# Patient Record
Sex: MALE | Race: WHITE | URBAN - NONMETROPOLITAN AREA
[De-identification: names, ages, dates, MRNs, and addresses within clinical notes are randomized per-mention and may not be internally consistent; named-entity substitution may affect disease eponyms.]

---

## 2022-12-16 ENCOUNTER — TELEPHONE (OUTPATIENT)
Dept: FAMILY MEDICINE CLINIC | Age: 72
End: 2022-12-16

## 2022-12-16 NOTE — TELEPHONE ENCOUNTER
Wife called on patient, he is not a current patient of our practice. Patient is covid positive. Went over the vitamin C, zinc D to help treat symptoms. Patient was taking Mucinex but wanted to be seen by one of our physicians for treatment. After discussing this with co workers. We had no new patient available appointments. Discussed with patient's wife that patient could be seen at urgent care or walk in clinics to be seen. Wife stated that he won't go. I did tell wife that if he starts running a high fever or becomes SOB that it would be best to go to ER. I did offer to sent [facility] a message to become a new patient but wife stated he would refuse.

## 2025-04-11 ENCOUNTER — HOSPITAL ENCOUNTER (EMERGENCY)
Age: 75
Discharge: HOME | End: 2025-04-11
Attending: EMERGENCY MEDICINE
Payer: MEDICARE

## 2025-04-11 ENCOUNTER — APPOINTMENT (OUTPATIENT)
Dept: CT IMAGING | Age: 75
End: 2025-04-11
Payer: MEDICARE

## 2025-04-11 VITALS
OXYGEN SATURATION: 99 % | HEART RATE: 74 BPM | DIASTOLIC BLOOD PRESSURE: 84 MMHG | BODY MASS INDEX: 27.4 KG/M2 | TEMPERATURE: 97.5 F | WEIGHT: 185 LBS | RESPIRATION RATE: 16 BRPM | HEIGHT: 69 IN | SYSTOLIC BLOOD PRESSURE: 155 MMHG
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
## 2025-04-11 DIAGNOSIS — S01.411A: Primary | ICD-10-CM

## 2025-04-11 DIAGNOSIS — S60.222A CONTUSION OF LEFT HAND, INITIAL ENCOUNTER: ICD-10-CM

## 2025-04-11 LAB
ALBUMIN SERPL BCG-MCNC: 4.7 G/DL (ref 3.4–4.9)
ALP SERPL-CCNC: 155 U/L (ref 40–129)
ALT SERPL W/O P-5'-P-CCNC: 12 U/L (ref 10–50)
ANION GAP SERPL CALC-SCNC: 13 MEQ/L (ref 8–16)
AST SERPL-CCNC: 25 U/L (ref 10–50)
BASOPHILS ABSOLUTE: 0.1 THOU/MM3 (ref 0–0.1)
BASOPHILS NFR BLD AUTO: 0.9 %
BILIRUB SERPL-MCNC: 0.7 MG/DL (ref 0.3–1.2)
BUN SERPL-MCNC: 10 MG/DL (ref 8–23)
CALCIUM SERPL-MCNC: 10.5 MG/DL (ref 8.8–10.2)
CHLORIDE SERPL-SCNC: 94 MEQ/L (ref 98–111)
CO2 SERPL-SCNC: 24 MEQ/L (ref 22–29)
CREAT SERPL-MCNC: 1 MG/DL (ref 0.7–1.2)
DEPRECATED RDW RBC AUTO: 46 FL (ref 35–45)
EOSINOPHIL NFR BLD AUTO: 1.4 %
EOSINOPHILS ABSOLUTE: 0.1 THOU/MM3 (ref 0–0.4)
ERYTHROCYTE [DISTWIDTH] IN BLOOD BY AUTOMATED COUNT: 13.4 % (ref 11.5–14.5)
GFR SERPL CREATININE-BSD FRML MDRD: 80 ML/MIN/1.73M2
GLUCOSE SERPL-MCNC: 78 MG/DL (ref 74–109)
HCT VFR BLD AUTO: 49.3 % (ref 42–52)
HGB BLD-MCNC: 16.8 GM/DL (ref 14–18)
IMM GRANULOCYTES # BLD AUTO: 0.02 THOU/MM3 (ref 0–0.07)
IMM GRANULOCYTES NFR BLD AUTO: 0.2 %
LYMPHOCYTES ABSOLUTE: 1.4 THOU/MM3 (ref 1–4.8)
LYMPHOCYTES NFR BLD AUTO: 16 %
MCH RBC QN AUTO: 31.8 PG (ref 26–33)
MCHC RBC AUTO-ENTMCNC: 34.1 GM/DL (ref 32.2–35.5)
MCV RBC AUTO: 93.4 FL (ref 80–94)
MONOCYTES ABSOLUTE: 0.7 THOU/MM3 (ref 0.4–1.3)
MONOCYTES NFR BLD AUTO: 8.1 %
NEUTROPHILS ABSOLUTE: 6.3 THOU/MM3 (ref 1.8–7.7)
NEUTROPHILS NFR BLD AUTO: 73.4 %
NRBC BLD AUTO-RTO: 0 /100 WBC
OSMOLALITY SERPL CALC.SUM OF ELEC: 260.6 MOSMOL/KG (ref 275–300)
PLATELET # BLD AUTO: 337 THOU/MM3 (ref 130–400)
PMV BLD AUTO: 9.5 FL (ref 9.4–12.4)
POTASSIUM SERPL-SCNC: 4.4 MEQ/L (ref 3.5–5.2)
PROT SERPL-MCNC: 8.3 G/DL (ref 6.4–8.3)
RBC # BLD AUTO: 5.28 MILL/MM3 (ref 4.7–6.1)
SODIUM SERPL-SCNC: 131 MEQ/L (ref 135–145)
WBC # BLD AUTO: 8.6 THOU/MM3 (ref 4.8–10.8)

## 2025-04-11 PROCEDURE — 6360000002 HC RX W HCPCS: Performed by: EMERGENCY MEDICINE

## 2025-04-11 PROCEDURE — 2580000003 HC RX 258: Performed by: EMERGENCY MEDICINE

## 2025-04-11 PROCEDURE — 90471 IMMUNIZATION ADMIN: CPT | Performed by: EMERGENCY MEDICINE

## 2025-04-11 PROCEDURE — 70486 CT MAXILLOFACIAL W/O DYE: CPT

## 2025-04-11 PROCEDURE — 99203 OFFICE O/P NEW LOW 30 MIN: CPT | Performed by: NURSE PRACTITIONER

## 2025-04-11 PROCEDURE — 80053 COMPREHEN METABOLIC PANEL: CPT

## 2025-04-11 PROCEDURE — 99215 OFFICE O/P EST HI 40 MIN: CPT

## 2025-04-11 PROCEDURE — 90715 TDAP VACCINE 7 YRS/> IM: CPT | Performed by: EMERGENCY MEDICINE

## 2025-04-11 PROCEDURE — 6370000000 HC RX 637 (ALT 250 FOR IP): Performed by: NURSE PRACTITIONER

## 2025-04-11 PROCEDURE — 85025 COMPLETE CBC W/AUTO DIFF WBC: CPT

## 2025-04-11 PROCEDURE — 36415 COLL VENOUS BLD VENIPUNCTURE: CPT

## 2025-04-11 PROCEDURE — 99205 OFFICE O/P NEW HI 60 MIN: CPT

## 2025-04-11 PROCEDURE — 12015 RPR F/E/E/N/L/M 7.6-12.5 CM: CPT

## 2025-04-11 PROCEDURE — 99284 EMERGENCY DEPT VISIT MOD MDM: CPT

## 2025-04-11 PROCEDURE — 72125 CT NECK SPINE W/O DYE: CPT

## 2025-04-11 PROCEDURE — 70450 CT HEAD/BRAIN W/O DYE: CPT

## 2025-04-11 PROCEDURE — 96365 THER/PROPH/DIAG IV INF INIT: CPT

## 2025-04-11 RX ORDER — CALCIUM CARBONATE 500 MG/1
1 TABLET, CHEWABLE ORAL DAILY
COMMUNITY

## 2025-04-11 RX ORDER — FERROUS SULFATE 325(65) MG
325 TABLET ORAL
COMMUNITY

## 2025-04-11 RX ORDER — ACETAMINOPHEN 325 MG/1
650 TABLET ORAL ONCE
Status: COMPLETED | OUTPATIENT
Start: 2025-04-11 | End: 2025-04-11

## 2025-04-11 RX ORDER — LIDOCAINE HYDROCHLORIDE AND EPINEPHRINE 10; 10 MG/ML; UG/ML
20 INJECTION, SOLUTION INFILTRATION; PERINEURAL ONCE
Status: COMPLETED | OUTPATIENT
Start: 2025-04-11 | End: 2025-04-11

## 2025-04-11 RX ORDER — MULTIVIT WITH MINERALS/LUTEIN
500 TABLET ORAL DAILY
COMMUNITY

## 2025-04-11 RX ORDER — LIDOCAINE HYDROCHLORIDE 20 MG/ML
20 INJECTION, SOLUTION INFILTRATION; PERINEURAL ONCE
Status: COMPLETED | OUTPATIENT
Start: 2025-04-11 | End: 2025-04-11

## 2025-04-11 RX ORDER — MULTIVITAMIN WITH IRON
1 TABLET ORAL DAILY
COMMUNITY

## 2025-04-11 RX ADMIN — SODIUM CHLORIDE 3000 MG: 9 INJECTION, SOLUTION INTRAVENOUS at 20:20

## 2025-04-11 RX ADMIN — TETANUS TOXOID, REDUCED DIPHTHERIA TOXOID AND ACELLULAR PERTUSSIS VACCINE, ADSORBED 0.5 ML: 5; 2.5; 8; 8; 2.5 SUSPENSION INTRAMUSCULAR at 19:27

## 2025-04-11 RX ADMIN — ACETAMINOPHEN 650 MG: 325 TABLET ORAL at 17:45

## 2025-04-11 RX ADMIN — LIDOCAINE HYDROCHLORIDE 20 ML: 20 INJECTION, SOLUTION INFILTRATION; PERINEURAL at 19:48

## 2025-04-11 RX ADMIN — LIDOCAINE HYDROCHLORIDE AND EPINEPHRINE 20 ML: 10; 10 INJECTION, SOLUTION INFILTRATION; PERINEURAL at 19:50

## 2025-04-11 ASSESSMENT — PAIN SCALES - GENERAL
PAINLEVEL_OUTOF10: 6
PAINLEVEL_OUTOF10: 5
PAINLEVEL_OUTOF10: 7

## 2025-04-11 ASSESSMENT — PAIN DESCRIPTION - PAIN TYPE: TYPE: ACUTE PAIN

## 2025-04-11 ASSESSMENT — PAIN DESCRIPTION - DESCRIPTORS
DESCRIPTORS_2: ACHING
DESCRIPTORS: ACHING
DESCRIPTORS: ACHING

## 2025-04-11 ASSESSMENT — ENCOUNTER SYMPTOMS
CHOKING: 0
ABDOMINAL PAIN: 0
COUGH: 0
BOWEL INCONTINENCE: 0
VISUAL CHANGE: 0
STRIDOR: 0
CHEST TIGHTNESS: 0
SHORTNESS OF BREATH: 0
NAUSEA: 0
COLOR CHANGE: 0
WHEEZING: 0
VOMITING: 0
APNEA: 0

## 2025-04-11 ASSESSMENT — PAIN DESCRIPTION - LOCATION
LOCATION: FACE
LOCATION: FACE

## 2025-04-11 ASSESSMENT — PAIN DESCRIPTION - ORIENTATION
ORIENTATION: RIGHT
ORIENTATION: RIGHT

## 2025-04-11 ASSESSMENT — PAIN - FUNCTIONAL ASSESSMENT
PAIN_FUNCTIONAL_ASSESSMENT: 0-10
PAIN_FUNCTIONAL_ASSESSMENT: 0-10

## 2025-04-11 NOTE — ED PROVIDER NOTES
[facility] EMERGENCY DEPARTMENT  Urgent Care Encounter      CHIEF COMPLAINT       Chief Complaint   Patient presents with    Fall     Pt fell in driveway outside and hit asphalt occurred around 1630    Laceration       Nurses Notes reviewed and I agree except as noted in the HPI.  HISTORY OFPRESENT ILLNESS   Alex A Temme is a 74 y.o.  The history is provided by the patient and a relative. No  was used.   Fall  The accident occurred 1 to 2 hours ago. The fall occurred while walking. He fell from a height of 3 to 5 ft. He landed on [address]. The point of impact was the head. The pain is present in the head (left hand). The pain is at a severity of 5/10. The pain is moderate. He was Ambulatory at the scene. There was No entrapment after the fall. There was No drug use involved in the accident. There was No alcohol use involved in the accident. Pertinent negatives include no visual change, no fever, no numbness, no abdominal pain, no bowel incontinence, no nausea, no vomiting, no hematuria, no headaches, no hearing loss, no loss of consciousness and no tingling. The symptoms are aggravated by pressure on the injury. He has tried ice and acetaminophen for the symptoms. Improvement on treatment: given at urgent care.       REVIEW OF SYSTEMS     Review of Systems   Constitutional:  Positive for activity change. Negative for appetite change, chills, diaphoresis, fatigue and fever.   Respiratory:  Negative for apnea, cough, choking, chest tightness, shortness of breath, wheezing and stridor.    Cardiovascular:  Negative for chest pain, palpitations and leg swelling.   Gastrointestinal:  Negative for abdominal pain, bowel incontinence, nausea and vomiting.   Genitourinary:  Negative for hematuria.   Skin:  Positive for wound. Negative for color change, pallor and rash.   Neurological:  Negative for tingling, loss of consciousness, numbness and headaches.       PAST MEDICAL  to person, place, and time.   Psychiatric:         Mood and Affect: Mood normal.         Behavior: Behavior normal.         Thought Content: Thought content normal.         Judgment: Judgment normal.         DIAGNOSTIC RESULTS   Labs:No results found for this visit on 04/11/25.    IMAGING:  No orders to display     URGENT CARE COURSE:     Vitals:    04/11/25 1725   BP: (!) 172/94   Pulse: 76   Resp: 16   Temp: 97.5 °F (36.4 °C)   SpO2: 100%   Weight: 83.9 kg (185 lb)   Height: 1.753 m (5' 9\")       Medications   acetaminophen (TYLENOL) tablet 650 mg (650 mg Oral Given 4/11/25 1745)     PROCEDURES:  None  FINAL IMPRESSION      1. Complex laceration of cheek, right, initial encounter        DISPOSITION/PLAN   Decision To Transfer     After reviewing the patients History of Present illness, Vital Signs,Clinical Findings,Comorbities, and Clinical Data obtained I discussed with the patient or patient representative that there is a very real potential for serious injury / illness and the patient will need to be transfer to a level of higher care for further evaluation and continued care. It was explained that this would provide the best care for the patient.   The patient/patient representative are agreeable to the treatment plan and are agreeable to be transferred therefore, the patient will be transferred to [facility] ED for reevaluation and further management .    Report was called to the accepting facility and given to Luke WHITT, who accepted the patients transfer. Patient was transferred from the Urgent Care in stable condition by private car, daughter driving.      REFERRED TO:  [facility]  [address] [facility] [place] [address]  Go today      DISCHARGE MEDICATIONS:  New Prescriptions    No medications on file     Current Discharge Medication List          AMY Joyner CNP, Tawnya Rae, APRN - CNP  04/11/25 5952

## 2025-04-11 NOTE — ED PROVIDER NOTES
[facility] EMERGENCY DEPARTMENT      EMERGENCY MEDICINE     Pt Name: Alex Garcia  MRN: [id number]  Birthdate 1950  Date of evaluation: 4/11/2025  Provider: Suzy Chris PA-C    CHIEF COMPLAINT       Chief Complaint   Patient presents with    Fall     Pt fell in driveway outside and hit asphalt occurred around 1630    Laceration     HISTORY OF PRESENT ILLNESS   Alex Garcia is a pleasant 74 y.o. male who presents to the emergency department from as a transfer from, urgent care for evaluation of fall, laceration.    Patient reported that around 1630 today he was outside when he fell on the sidewalk face first.  Patient denies dizziness/lightheadedness/vision changes prior and after to the fall.  He states due to pain she shuffles around when ambulating and tripped on something that caused the fall.  Patient landed on the right side of his face.  Patient denies loss of consciousness, changes in vision, nausea vomiting, chest pain, difficulty breathing, being on anticoagulants/antiplatelets.  Patient states his current pain level is 5/10 with a burning sensation.  Patient's left posterior hand also suffered a laceration/contusion.  Otherwise patient has no further complaints.    PASTMEDICAL HISTORY   History reviewed. No pertinent past medical history.    There is no problem list on file for this patient.    SURGICAL HISTORY       Past Surgical History:   Procedure Laterality Date    STOMACH FOREIGN BODY REMOVAL  1985    partial removal due ulcers       CURRENT MEDICATIONS       Previous Medications    ASCORBIC ACID (VITAMIN C) 250 MG TABLET    Take 2 tablets by mouth daily    CALCIUM CARBONATE (TUMS) 500 MG CHEWABLE TABLET    Take 1 tablet by mouth daily    ELDERBERRY PO    Take 1 tablet by mouth    FERROUS SULFATE (IRON 325) 325 (65 FE) MG TABLET    Take 1 tablet by mouth daily (with breakfast)    MULTIPLE VITAMIN (MULTIVITAMIN) TABS TABLET    Take 1 tablet by mouth daily    VITAMIN D (CHOLECALCIFEROL) 25  CBC, CMP - unremarkable               External Documentation Reviewed:         Previous patient encounter documents & history available on EMR was reviewed              See Formal Diagnostic Results above for the lab and radiology tests and orders.    3)  Treatment and Disposition         ED Reassessment: See ED course          Case discussed with consulting clinician:  Dr. Blackwell          Shared Decision-Making was performed and disposition discussed with the        Patient/Family and questions answered          Code Status:  Full       Summary of Patient Presentation:      MDM  /  ED Course as of 04/11/25 2122 Fri Apr 11, 2025 1847 Case presented to Dr. Blackwell. Assessed pt's wound. Through shared decision making, pt wanted the laceration repair done in the ED. Plastic surgery option was given but the pt declined at this time.  [LK]   1857 CT facial bones, cervical spine, head ordered  [LK]   2046 Sutures completed. 5 internal sutures and 11 external sutures placed.  [LK]   2114 Tetanus given. Unasyn administered.  [LK]   2115 Augmentin prescribed for abx prophylaxis  [LK]   2121 Reassessed the patient.  Patient sitting down comfortably in the ED bed.  Sutures in place.  No immediate complications visually noted.  I gave the patient strict return precautions.  Patient is a follow-up with either his family doctor or come back to the ED in 5 to 7 days to have the external sutures removed.  Patient verbalized understanding. [LK]      ED Course User Index  [LK] Suzy Chris PA-C     Vitals Reviewed:    Vitals:    04/11/25 1725 04/11/25 1838 04/11/25 1926   BP: (!) 172/94 (!) 176/85 (!) 155/84   Pulse: 76 74 74   Resp: 16 18 16   Temp: 97.5 °F (36.4 °C)     SpO2: 100% 99% 99%   Weight: 83.9 kg (185 lb)     Height: 1.753 m (5' 9\")     Patient reported that around 1630 today he was outside when he fell on the sidewalk face first.  Patient denies dizziness/lightheadedness/vision changes prior and after to the fall.  He states

## 2025-04-11 NOTE — DISCHARGE INSTRUCTIONS
Go to your primary care physician or return to the emergency department in 5-7 days to have your sutures removed.    Take antibiotics as prescribed.    For pain use acetaminophen (Tylenol) or ibuprofen (Motrin / Advil), unless prescribed medications that have acetaminophen or ibuprofen (or similar medications) in it.  You can take over the counter acetaminophen tablets (1 - 2 tablets of the 500-mg strength every 6 hours) or ibuprofen tablets (2 tablets every 4 hours).    You can shower with the laceration, would avoid baths or swimming in lakes / rivers.  Apply bacitracin / triple antibiotic ointment / Neosporin / Vaseline to the wound twice a day.    When you go outside, place sunscreen on the healing wound after the sutures have been removed for the next year to help with scarring.    PLEASE RETURN TO THE EMERGENCY DEPARTMENT IMMEDIATELY for worsening symptoms, redness around the wound or redness streaking up the body part, white drainage from the wound, or if you develop any concerning symptoms such as: high fever not relieved by acetaminophen (Tylenol) and/or ibuprofen (Motrin / Advil), chills, shortness of breath, chest pain, feeling of your heart fluttering or racing, persistent nausea and/or vomiting, vomiting up blood, blood in your stool, numbness, loss of consciousness, weakness or tingling in the arms or legs or change in color of the extremities, changes in mental status, persistent headache, blurry vision, loss of bladder / bowel control, unable to follow up with your physician, or other any other care or concern.

## 2025-04-11 NOTE — ED NOTES
Patient taken to vehicle by wheelchair, patient did state on the way out that he felt nauseated after his fall and had taken some Tums. Patient denies any nausea now.      Niru Ybarra, RN  04/11/25 9111

## 2025-04-11 NOTE — ED NOTES
Moist 4 x 4 gauze pack applied to right facial laceration and covered with ABD pad and secured with ace wrap and tape. Wound wash applied to left hand skin tear and right forearm abrasion and patted dry. Left hand skin tear has dressing intact with coban.      Aruna Lewis, RN  04/11/25 7931

## 2025-04-11 NOTE — ED PROVIDER NOTES
PATIENT NAME: Alex Garcia  MRN: [id number]  : 1950  GONZALEZ: 2025    I have seen and examined the patient myself and I have reviewed the advanced practice clinician's chart. Please refer to advanced practice clinician's chart for detailed history of present illness, physical exam and medical decision making. I agree with [facility]'s assessment and plan.     MEDICAL DEDISION MAKING (MDM)     Alex Garcia is a 74 y.o. male who presents with fall and right side facial complex lacerations. Seen below pictures.     Mechanical fall.  No LOC.  Patient fell on the asphalt ground.  He is not taking OAC.     CTs head, C-spine, and facial bone do not reveal internal injuries, no facial fractures.    Lacerations are repaired in the ED with my assistance, please refer to [facility]'s procedure notes.     Patient receives tetanus boost and Unasyn in ED.    Discharged with Augmentin prescribed.    PCP follow-up in 5-7 days for wound check and suture removal.        Vitals:    25 1725 25 1838 25   BP: (!) 172/94 (!) 176/85 (!) 155/84   Pulse: 76 74 74   Resp: 16 18 16   Temp: 97.5 °F (36.4 °C)     SpO2: 100% 99% 99%   Weight: 83.9 kg (185 lb)     Height: 1.753 m (5' 9\")       Medications   acetaminophen (TYLENOL) tablet 650 mg (650 mg Oral Given 25 174)   tetanus-diphth-acell pertussis (BOOSTRIX) injection 0.5 mL (0.5 mLs IntraMUSCular Given 25)   lidocaine-EPINEPHrine 1 %-1:027042 injection 20 mL (20 mLs IntraDERmal Given 25)   lidocaine 2 % injection 20 mL (20 mLs IntraDERmal Given 25)   ampicillin-sulbactam (UNASYN) 3,000 mg in sodium chloride 0.9 % 100 mL IVPB (addEASE) (0 mg IntraVENous Stopped 25)     Labs Reviewed   CBC WITH AUTO DIFFERENTIAL - Abnormal; Notable for the following components:       Result Value    RDW-SD 46.0 (*)     All other components within normal limits   COMPREHENSIVE METABOLIC PANEL - Abnormal; Notable for the following components:

## 2025-04-11 NOTE — ED TRIAGE NOTES
Pt fell in driveway outside and hit asphalt occurred around 1630 4/11/25. Pt has large laceration noted near right eye and fascia noted at laceration site, large skin tear shape of V posterior left hand. Pt denies LOC. Pt states it \"took him a little bit to stand up and get back in the house \"and then patient called daughter who brought patient to [facility]. Pt states he has a hx of falling because he \"shuffles\" his feet.  Pt is A & O x 3. Respirations are non-labored.

## 2025-04-11 NOTE — ED NOTES
Pt to go to [facility] ER via private vehicle daughter driving. Assisted to private vehicle by nurse and wheelchair. Pt is A & O x 3. Respirations are non-labored.      Aruna Lewis RN  04/11/25 0185

## 2025-04-11 NOTE — ED NOTES
Pt informed nurse he has not been to the \"doctor's since the 1980's\".  Pt agreeable to go to [facility] ER.     Aruna Lewis, RN  04/11/25 1807

## 2025-04-17 ENCOUNTER — OFFICE VISIT (OUTPATIENT)
Dept: FAMILY MEDICINE CLINIC | Age: 75
End: 2025-04-17

## 2025-04-17 VITALS
TEMPERATURE: 97.7 F | HEIGHT: 69 IN | BODY MASS INDEX: 27.88 KG/M2 | DIASTOLIC BLOOD PRESSURE: 84 MMHG | HEART RATE: 83 BPM | RESPIRATION RATE: 19 BRPM | WEIGHT: 188.2 LBS | SYSTOLIC BLOOD PRESSURE: 132 MMHG | OXYGEN SATURATION: 96 %

## 2025-04-17 DIAGNOSIS — Z91.81 AT HIGH RISK FOR FALLS: ICD-10-CM

## 2025-04-17 DIAGNOSIS — Z13.29 SCREENING FOR THYROID DISORDER: ICD-10-CM

## 2025-04-17 DIAGNOSIS — Z13.1 SCREENING FOR DIABETES MELLITUS: ICD-10-CM

## 2025-04-17 DIAGNOSIS — Z13.220 SCREENING FOR LIPID DISORDERS: ICD-10-CM

## 2025-04-17 DIAGNOSIS — Z76.89 ENCOUNTER TO ESTABLISH CARE: Primary | ICD-10-CM

## 2025-04-17 DIAGNOSIS — S01.81XD FACIAL LACERATION, SUBSEQUENT ENCOUNTER: ICD-10-CM

## 2025-04-17 DIAGNOSIS — Z48.02 ENCOUNTER FOR REMOVAL OF SUTURES: ICD-10-CM

## 2025-04-17 RX ORDER — MUPIROCIN 20 MG/G
OINTMENT TOPICAL
Qty: 15 G | Refills: 0 | Status: SHIPPED | OUTPATIENT
Start: 2025-04-17 | End: 2025-04-24

## 2025-04-17 SDOH — ECONOMIC STABILITY: FOOD INSECURITY: WITHIN THE PAST 12 MONTHS, THE FOOD YOU BOUGHT JUST DIDN'T LAST AND YOU DIDN'T HAVE MONEY TO GET MORE.: NEVER TRUE

## 2025-04-17 SDOH — ECONOMIC STABILITY: FOOD INSECURITY: WITHIN THE PAST 12 MONTHS, YOU WORRIED THAT YOUR FOOD WOULD RUN OUT BEFORE YOU GOT MONEY TO BUY MORE.: NEVER TRUE

## 2025-04-17 ASSESSMENT — PATIENT HEALTH QUESTIONNAIRE - PHQ9
2. FEELING DOWN, DEPRESSED OR HOPELESS: NOT AT ALL
1. LITTLE INTEREST OR PLEASURE IN DOING THINGS: NOT AT ALL
SUM OF ALL RESPONSES TO PHQ QUESTIONS 1-9: 0

## 2025-04-17 NOTE — PROGRESS NOTES
Alex Garcia is a 74 y.o. male who presents today for:  Chief Complaint   Patient presents with    New Patient     In office as a new patient establishing care. States he's here to have stitches removed. Feels very unsteady on feet worried about falling.      HPI:   Alex Garcia is 74 y.o. who presents today for ED follow-up visit/establish care.  Has not seen a doctor in 35+ years.    Patient seen in ED on 4/11 for facial contusion/laceration after mechanical fall sustained while walking dogs.  Patient notes fourth fall sustained in the last 7 months.  No LOC.  Not on OAC.   Imaging negative for acute pathology including fractures, internal injuries.  Noted right-sided facial lac.  Treated with laceration repair and Unasyn x 1 in ED, discharged on Augmentin.  Today, patient presents for follow-up, suture removal.  Reports that he took augmentin for 2 days, however stopped taking due to symptoms of diarrhea with medication use.  Denies any HA, numbness, tingling, lightheadedness, drowsiness, LOC, presyncope/syncopal episode, CP, SOB, or any other associated symptoms from recent fall.  Uses cane currently for support to walk.      Labs done in ED on 4/11 reviewed in office with patient.  CMP showed mild hyponatremia, hypercalcemia, elevated ALP.  CBC WNL.    Past medical history/chronic conditions - nil    Surgical history -- sx for stomach ulcer several years ago    Meds -- multivitamins    Family history -- nil    Social history -- lives with wife at home.  No environmental safety concerns noted.  Has 2 daughters who live in [place].    Denies any chest pain, shortness of breath, headache, dizziness, abdominal pain, nausea, vomiting, diarrhea, leg swelling, numbness, tingling or any other complaints at this time.    Objective:     Vitals:    04/17/25 0807   BP: 132/84   BP Site: Left Upper Arm   Patient Position: Sitting   BP Cuff Size: Medium Adult   Pulse: 83   Resp: 19   Temp: 97.7 °F (36.5 °C)

## 2025-06-17 ENCOUNTER — HOSPITAL ENCOUNTER (OUTPATIENT)
Dept: PHYSICAL THERAPY | Age: 75
Setting detail: THERAPIES SERIES
Discharge: HOME | End: 2025-06-17
Payer: MEDICARE

## 2025-06-17 PROCEDURE — 97110 THERAPEUTIC EXERCISES: CPT

## 2025-06-17 PROCEDURE — 97162 PT EVAL MOD COMPLEX 30 MIN: CPT

## 2025-06-17 NOTE — PROGRESS NOTES
[facility]  PHYSICAL THERAPY  [x] EVALUATION  [] DAILY NOTE (LAND) [] DAILY NOTE (AQUATIC ) [] PROGRESS NOTE [] DISCHARGE NOTE    [x] OUTPATIENT REHABILITATION CENTER [facility]   [] [facility] CARE [place]    [] [facility]   [] [facility]    Date: 2025  Patient Name:  Alex Garcia  : 1950  MRN: [id number]  CSN: [id number]    Referring Practitioner Nina Morrison MD [id number]      Diagnosis  Diagnoses       Z91.81 (ICD-10-CM) - At high risk for falls           Treatment Diagnosis R26.81  Unsteadiness on feet  M62.81 Generalized Muscle Weakness  R26.89  Abnormalities of gait and mobility   Date of Evaluation 25   Additional Pertinent History Alex Garcia has no past medical history on file.  he has a past surgical history that includes Stomach foreign body removal ().     Allergies No Known Allergies   Medications   Current Outpatient Medications:     Handicap Placard MISC, by Does not apply route, Disp: 1 each, Rfl: 0    Multiple Vitamin (MULTIVITAMIN) TABS tablet, Take 1 tablet by mouth daily, Disp: , Rfl:     Ascorbic Acid (VITAMIN C) 250 MG tablet, Take 2 tablets by mouth daily, Disp: , Rfl:     ferrous sulfate (IRON 325) 325 (65 Fe) MG tablet, Take 1 tablet by mouth daily (with breakfast), Disp: , Rfl:     ELDERBERRY PO, Take 1 tablet by mouth, Disp: , Rfl:     vitamin D (CHOLECALCIFEROL) 25 MCG (1000 UT) TABS tablet, Take 1 tablet by mouth daily, Disp: , Rfl:     calcium carbonate (TUMS) 500 MG chewable tablet, Take 1 tablet by mouth daily, Disp: , Rfl:       Functional Outcome Measure Used ALTMAN, 5XSTS   Functional Outcome Score 39/56, 19 sec (25)       Insurance: Primary: Payor: MEDICARE /  /  / ,   Secondary: [facility]   Authorization Information INSURANCE THERAPY BENEFIT:  No visit limit. Treatment must be medically necessary and must required the skill of a licensed therapist or therapist assistant.  Benefit will not cover maintenance or

## 2025-07-01 ENCOUNTER — HOSPITAL ENCOUNTER (OUTPATIENT)
Dept: PHYSICAL THERAPY | Age: 75
Setting detail: THERAPIES SERIES
Discharge: HOME | End: 2025-07-01
Payer: MEDICARE

## 2025-07-01 PROCEDURE — 97112 NEUROMUSCULAR REEDUCATION: CPT

## 2025-07-01 NOTE — PROGRESS NOTES
[facility]  PHYSICAL THERAPY  [] EVALUATION  [x] DAILY NOTE (LAND) [] DAILY NOTE (AQUATIC ) [] PROGRESS NOTE [] DISCHARGE NOTE    [x] OUTPATIENT REHABILITATION CENTER [facility]   [] [facility] CARE [place]    [] [facility]   [] [facility]    Date: 2025  Patient Name:  Alex Garcia  : 1950  MRN: [id number]  CSN: [id number]    Referring Practitioner Nina Morrison MD [id number]      Diagnosis  Diagnoses       Z91.81 (ICD-10-CM) - At high risk for falls           Treatment Diagnosis R26.81  Unsteadiness on feet  M62.81 Generalized Muscle Weakness  R26.89  Abnormalities of gait and mobility   Date of Evaluation 25   Additional Pertinent History Alex Garcia has no past medical history on file.  he has a past surgical history that includes Stomach foreign body removal ().     Allergies No Known Allergies   Medications   Current Outpatient Medications:     Handicap Placard MISC, by Does not apply route, Disp: 1 each, Rfl: 0    Multiple Vitamin (MULTIVITAMIN) TABS tablet, Take 1 tablet by mouth daily, Disp: , Rfl:     Ascorbic Acid (VITAMIN C) 250 MG tablet, Take 2 tablets by mouth daily, Disp: , Rfl:     ferrous sulfate (IRON 325) 325 (65 Fe) MG tablet, Take 1 tablet by mouth daily (with breakfast), Disp: , Rfl:     ELDERBERRY PO, Take 1 tablet by mouth, Disp: , Rfl:     vitamin D (CHOLECALCIFEROL) 25 MCG (1000 UT) TABS tablet, Take 1 tablet by mouth daily, Disp: , Rfl:     calcium carbonate (TUMS) 500 MG chewable tablet, Take 1 tablet by mouth daily, Disp: , Rfl:       Functional Outcome Measure Used ALTMAN, 5XSTS   Functional Outcome Score 39/56, 19 sec (25)       Insurance: Primary: Payor: MEDICARE /  /  / ,   Secondary: [facility]   Authorization Information INSURANCE THERAPY BENEFIT:  No visit limit. Treatment must be medically necessary and must required the skill of a licensed therapist or therapist assistant.  Benefit will not cover maintenance or

## 2025-07-15 ENCOUNTER — HOSPITAL ENCOUNTER (OUTPATIENT)
Dept: PHYSICAL THERAPY | Age: 75
Setting detail: THERAPIES SERIES
Discharge: HOME | End: 2025-07-15
Payer: MEDICARE

## 2025-07-15 PROCEDURE — 97110 THERAPEUTIC EXERCISES: CPT

## 2025-07-15 PROCEDURE — 97112 NEUROMUSCULAR REEDUCATION: CPT

## 2025-07-15 NOTE — PROGRESS NOTES
[facility]  PHYSICAL THERAPY  [] EVALUATION  [x] DAILY NOTE (LAND) [] DAILY NOTE (AQUATIC ) [] PROGRESS NOTE [] DISCHARGE NOTE    [x] OUTPATIENT REHABILITATION CENTER [facility]   [] [facility] CARE [place]    [] [facility]   [] [facility]    Date: 7/15/2025  Patient Name:  Alex Garcia  : 1950  MRN: [id number]  CSN: [id number]    Referring Practitioner Nina Morrison MD [id number]      Diagnosis  Diagnoses       Z91.81 (ICD-10-CM) - At high risk for falls           Treatment Diagnosis R26.81  Unsteadiness on feet  M62.81 Generalized Muscle Weakness  R26.89  Abnormalities of gait and mobility   Date of Evaluation 25   Additional Pertinent History Alex Garcia has no past medical history on file.  he has a past surgical history that includes Stomach foreign body removal ().     Allergies No Known Allergies   Medications   Current Outpatient Medications:     Handicap Placard MISC, by Does not apply route, Disp: 1 each, Rfl: 0    Multiple Vitamin (MULTIVITAMIN) TABS tablet, Take 1 tablet by mouth daily, Disp: , Rfl:     Ascorbic Acid (VITAMIN C) 250 MG tablet, Take 2 tablets by mouth daily, Disp: , Rfl:     ferrous sulfate (IRON 325) 325 (65 Fe) MG tablet, Take 1 tablet by mouth daily (with breakfast), Disp: , Rfl:     ELDERBERRY PO, Take 1 tablet by mouth, Disp: , Rfl:     vitamin D (CHOLECALCIFEROL) 25 MCG (1000 UT) TABS tablet, Take 1 tablet by mouth daily, Disp: , Rfl:     calcium carbonate (TUMS) 500 MG chewable tablet, Take 1 tablet by mouth daily, Disp: , Rfl:       Functional Outcome Measure Used ALTMAN, 5XSTS   Functional Outcome Score 39/56, 19 sec (25)       Insurance: Primary: Payor: MEDICARE /  /  / ,   Secondary: [facility]   Authorization Information INSURANCE THERAPY BENEFIT:  No visit limit. Treatment must be medically necessary and must required the skill of a licensed therapist or therapist assistant.  Benefit will not cover maintenance or

## 2025-07-17 ENCOUNTER — HOSPITAL ENCOUNTER (OUTPATIENT)
Dept: PHYSICAL THERAPY | Age: 75
Setting detail: THERAPIES SERIES
Discharge: HOME | End: 2025-07-17
Payer: MEDICARE

## 2025-07-17 PROCEDURE — 97112 NEUROMUSCULAR REEDUCATION: CPT

## 2025-07-17 PROCEDURE — 97110 THERAPEUTIC EXERCISES: CPT

## 2025-07-17 NOTE — PROGRESS NOTES
[facility]  PHYSICAL THERAPY  [] EVALUATION  [x] DAILY NOTE (LAND) [] DAILY NOTE (AQUATIC ) [] PROGRESS NOTE [] DISCHARGE NOTE    [x] OUTPATIENT REHABILITATION CENTER [facility]   [] [facility] CARE [place]    [] [facility]   [] [facility]    Date: 2025  Patient Name:  Alex Garcia  : 1950  MRN: [id number]  CSN: [id number]    Referring Practitioner Nina Morrison MD [id number]      Diagnosis  Diagnoses       Z91.81 (ICD-10-CM) - At high risk for falls           Treatment Diagnosis R26.81  Unsteadiness on feet  M62.81 Generalized Muscle Weakness  R26.89  Abnormalities of gait and mobility   Date of Evaluation 25   Additional Pertinent History Alex Garcia has no past medical history on file.  he has a past surgical history that includes Stomach foreign body removal ().     Allergies No Known Allergies   Medications   Current Outpatient Medications:     Handicap Placard MISC, by Does not apply route, Disp: 1 each, Rfl: 0    Multiple Vitamin (MULTIVITAMIN) TABS tablet, Take 1 tablet by mouth daily, Disp: , Rfl:     Ascorbic Acid (VITAMIN C) 250 MG tablet, Take 2 tablets by mouth daily, Disp: , Rfl:     ferrous sulfate (IRON 325) 325 (65 Fe) MG tablet, Take 1 tablet by mouth daily (with breakfast), Disp: , Rfl:     ELDERBERRY PO, Take 1 tablet by mouth, Disp: , Rfl:     vitamin D (CHOLECALCIFEROL) 25 MCG (1000 UT) TABS tablet, Take 1 tablet by mouth daily, Disp: , Rfl:     calcium carbonate (TUMS) 500 MG chewable tablet, Take 1 tablet by mouth daily, Disp: , Rfl:       Functional Outcome Measure Used ALTMAN, 5XSTS   Functional Outcome Score 39/56, 19 sec (25)       Insurance: Primary: Payor: MEDICARE /  /  / ,   Secondary: [facility]   Authorization Information INSURANCE THERAPY BENEFIT:  No visit limit. Treatment must be medically necessary and must required the skill of a licensed therapist or therapist assistant.  Benefit will not cover maintenance or

## 2025-07-22 ENCOUNTER — HOSPITAL ENCOUNTER (OUTPATIENT)
Dept: PHYSICAL THERAPY | Age: 75
Setting detail: THERAPIES SERIES
Discharge: HOME | End: 2025-07-22
Payer: MEDICARE

## 2025-07-22 PROCEDURE — 97110 THERAPEUTIC EXERCISES: CPT

## 2025-07-22 NOTE — PROGRESS NOTES
[facility]  PHYSICAL THERAPY  [] EVALUATION  [x] DAILY NOTE (LAND) [] DAILY NOTE (AQUATIC ) [] PROGRESS NOTE [] DISCHARGE NOTE    [x] OUTPATIENT REHABILITATION CENTER [facility]   [] [facility]    [] [facility]   [] [facility]    Date: 2025  Patient Name:  Alex Garcia  : 1950  MRN: [id number]  CSN: [id number]    Referring Practitioner Nina Morrison MD [id number]      Diagnosis  Diagnoses       Z91.81 (ICD-10-CM) - At high risk for falls           Treatment Diagnosis R26.81  Unsteadiness on feet  M62.81 Generalized Muscle Weakness  R26.89  Abnormalities of gait and mobility   Date of Evaluation 25   Additional Pertinent History Alex Garcia has no past medical history on file.  he has a past surgical history that includes Stomach foreign body removal ().     Allergies No Known Allergies   Medications   Current Outpatient Medications:     Handicap Placard MISC, by Does not apply route, Disp: 1 each, Rfl: 0    Multiple Vitamin (MULTIVITAMIN) TABS tablet, Take 1 tablet by mouth daily, Disp: , Rfl:     Ascorbic Acid (VITAMIN C) 250 MG tablet, Take 2 tablets by mouth daily, Disp: , Rfl:     ferrous sulfate (IRON 325) 325 (65 Fe) MG tablet, Take 1 tablet by mouth daily (with breakfast), Disp: , Rfl:     ELDERBERRY PO, Take 1 tablet by mouth, Disp: , Rfl:     vitamin D (CHOLECALCIFEROL) 25 MCG (1000 UT) TABS tablet, Take 1 tablet by mouth daily, Disp: , Rfl:     calcium carbonate (TUMS) 500 MG chewable tablet, Take 1 tablet by mouth daily, Disp: , Rfl:       Functional Outcome Measure Used ALTMAN, 5XSTS   Functional Outcome Score 39/56, 19 sec (25)       Insurance: Primary: Payor: MEDICARE /  /  / ,   Secondary: [facility]   Authorization Information INSURANCE THERAPY BENEFIT:  No visit limit. Treatment must be medically necessary and must required the skill of a licensed therapist or therapist assistant.  Benefit will not cover maintenance or

## 2025-07-24 ENCOUNTER — HOSPITAL ENCOUNTER (OUTPATIENT)
Dept: PHYSICAL THERAPY | Age: 75
Setting detail: THERAPIES SERIES
Discharge: HOME | End: 2025-07-24
Payer: MEDICARE

## 2025-07-24 PROCEDURE — 97112 NEUROMUSCULAR REEDUCATION: CPT

## 2025-07-24 PROCEDURE — 97110 THERAPEUTIC EXERCISES: CPT

## 2025-07-24 NOTE — PROGRESS NOTES
[facility]  PHYSICAL THERAPY  [] EVALUATION  [x] DAILY NOTE (LAND) [] DAILY NOTE (AQUATIC ) [] PROGRESS NOTE [] DISCHARGE NOTE    [x] OUTPATIENT REHABILITATION CENTER [facility]   [] [facility] CARE [place]    [] [facility]   [] [facility]    Date: 2025  Patient Name:  Alex Garcia  : 1950  MRN: [id number]  CSN: [id number]    Referring Practitioner Nina Morrison MD [id number]      Diagnosis  Diagnoses       Z91.81 (ICD-10-CM) - At high risk for falls           Treatment Diagnosis R26.81  Unsteadiness on feet  M62.81 Generalized Muscle Weakness  R26.89  Abnormalities of gait and mobility   Date of Evaluation 25   Additional Pertinent History Alex Garcia has no past medical history on file.  he has a past surgical history that includes Stomach foreign body removal ().     Allergies No Known Allergies   Medications   Current Outpatient Medications:     Handicap Placard MISC, by Does not apply route, Disp: 1 each, Rfl: 0    Multiple Vitamin (MULTIVITAMIN) TABS tablet, Take 1 tablet by mouth daily, Disp: , Rfl:     Ascorbic Acid (VITAMIN C) 250 MG tablet, Take 2 tablets by mouth daily, Disp: , Rfl:     ferrous sulfate (IRON 325) 325 (65 Fe) MG tablet, Take 1 tablet by mouth daily (with breakfast), Disp: , Rfl:     ELDERBERRY PO, Take 1 tablet by mouth, Disp: , Rfl:     vitamin D (CHOLECALCIFEROL) 25 MCG (1000 UT) TABS tablet, Take 1 tablet by mouth daily, Disp: , Rfl:     calcium carbonate (TUMS) 500 MG chewable tablet, Take 1 tablet by mouth daily, Disp: , Rfl:       Functional Outcome Measure Used ALMTAN, 5XSTS   Functional Outcome Score 39/56, 19 sec (25)       Insurance: Primary: Payor: MEDICARE /  /  / ,   Secondary: [facility]   Authorization Information INSURANCE THERAPY BENEFIT:  No visit limit. Treatment must be medically necessary and must required the skill of a licensed therapist or therapist assistant.  Benefit will not cover maintenance or

## 2025-07-29 ENCOUNTER — HOSPITAL ENCOUNTER (OUTPATIENT)
Dept: PHYSICAL THERAPY | Age: 75
Setting detail: THERAPIES SERIES
Discharge: HOME | End: 2025-07-29
Payer: MEDICARE

## 2025-07-29 PROCEDURE — 97112 NEUROMUSCULAR REEDUCATION: CPT

## 2025-07-29 PROCEDURE — 97110 THERAPEUTIC EXERCISES: CPT

## 2025-07-29 NOTE — PROGRESS NOTES
added lat today   Alternating step taps fwd and lat 15* 4\" x    Gastroc stretch off edge of // bars 3x30s  x    Seated HS stretch 3x20s  x \"I like this one\"   Sit<>stands from standard chair with Airex pad* 5  x 1 UE   Rockerboard* fwd and lat 10 taps 20s bal x 1 UE taps, light UE bal          [facility] retrieval task 6 cones   SBA          Stair negotiation with single HR 2x4 steps  6\"  Step-to pattern            Specific Interventions Next Treatment: NuStep warm up, balance and proprioceptive training, LE strengthening, gastroc and HS stretching    Activity/Treatment Tolerance:  [x]  Patient tolerated treatment well  []  Patient limited by fatigue  []  Patient limited by pain   []  Patient limited by medical complications  []  Other:     Assessment: Continued with strengthening and balance tasks with progressions made as indicated by *. Demonstrated fair stability today with airex and tandem stance. Was challenged with rockerboard. Demonstrated improved use of LE during transfers with only 1 UE with sit<>stands, lacks eccentric control with return to sitting. Reported fatigue at end of session. Will continue to progress as tolerated by patient.     GOALS:  Patient Goal: improve balance     Short Term Goals:  Time Frame: 4 weeks     1. Patient will complete sit<>stand without UE to promote functional strength necessary for transfers.  2. Patient will increase ALTMAN score from 39/56 at eval to 42/56 to reduce fall risk and promote overall safety with functional mobility tasks.      Long Term Goals:  Time Frame: 8 weeks     1. Patient will be indep with HEP in order to prevent re-injury and improve ability to perform functional mobility tasks.   2. Patient will increase ALTMAN score from 39/56 at eval to 45/56 to reduce fall risk and promote overall safety with functional mobility tasks.   3. Patient will reduce 5x STS time from 19 sec at eval to 13 sec to reduce fall risk.  4. Patient will ambulate with no AD while

## 2025-07-31 ENCOUNTER — HOSPITAL ENCOUNTER (OUTPATIENT)
Dept: PHYSICAL THERAPY | Age: 75
Setting detail: THERAPIES SERIES
Discharge: HOME | End: 2025-07-31
Payer: MEDICARE

## 2025-07-31 PROCEDURE — 97110 THERAPEUTIC EXERCISES: CPT

## 2025-07-31 PROCEDURE — 97112 NEUROMUSCULAR REEDUCATION: CPT

## 2025-07-31 NOTE — PROGRESS NOTES
[facility]  PHYSICAL THERAPY  [] EVALUATION  [x] DAILY NOTE (LAND) [] DAILY NOTE (AQUATIC ) [] PROGRESS NOTE [] DISCHARGE NOTE    [x] OUTPATIENT REHABILITATION CENTER [facility]   [] [facility] CARE [place]    [] [facility]   [] [facility]    Date: 2025  Patient Name:  Alex Garcia  : 1950  MRN: [id number]  CSN: [id number]    Referring Practitioner Nina Morrison MD [id number]      Diagnosis  Diagnoses       Z91.81 (ICD-10-CM) - At high risk for falls           Treatment Diagnosis R26.81  Unsteadiness on feet  M62.81 Generalized Muscle Weakness  R26.89  Abnormalities of gait and mobility   Date of Evaluation 25   Additional Pertinent History Alex Garcia has no past medical history on file.  he has a past surgical history that includes Stomach foreign body removal ().     Allergies No Known Allergies   Medications   Current Outpatient Medications:     Handicap Placard MISC, by Does not apply route, Disp: 1 each, Rfl: 0    Multiple Vitamin (MULTIVITAMIN) TABS tablet, Take 1 tablet by mouth daily, Disp: , Rfl:     Ascorbic Acid (VITAMIN C) 250 MG tablet, Take 2 tablets by mouth daily, Disp: , Rfl:     ferrous sulfate (IRON 325) 325 (65 Fe) MG tablet, Take 1 tablet by mouth daily (with breakfast), Disp: , Rfl:     ELDERBERRY PO, Take 1 tablet by mouth, Disp: , Rfl:     vitamin D (CHOLECALCIFEROL) 25 MCG (1000 UT) TABS tablet, Take 1 tablet by mouth daily, Disp: , Rfl:     calcium carbonate (TUMS) 500 MG chewable tablet, Take 1 tablet by mouth daily, Disp: , Rfl:       Functional Outcome Measure Used ALTMAN, 5XSTS   Functional Outcome Score 39/56, 19 sec (25)       Insurance: Primary: Payor: MEDICARE /  /  / ,   Secondary: [facility]   Authorization Information INSURANCE THERAPY BENEFIT:  No visit limit. Treatment must be medically necessary and must required the skill of a licensed therapist or therapist assistant.  Benefit will not cover maintenance or

## 2025-08-05 ENCOUNTER — HOSPITAL ENCOUNTER (OUTPATIENT)
Dept: PHYSICAL THERAPY | Age: 75
Setting detail: THERAPIES SERIES
Discharge: HOME | End: 2025-08-05
Payer: MEDICARE

## 2025-08-05 PROCEDURE — 97110 THERAPEUTIC EXERCISES: CPT

## 2025-08-05 PROCEDURE — 97530 THERAPEUTIC ACTIVITIES: CPT

## 2025-08-05 PROCEDURE — 97112 NEUROMUSCULAR REEDUCATION: CPT

## 2025-08-07 ENCOUNTER — HOSPITAL ENCOUNTER (OUTPATIENT)
Dept: PHYSICAL THERAPY | Age: 75
Setting detail: THERAPIES SERIES
Discharge: HOME | End: 2025-08-07
Payer: MEDICARE

## 2025-08-07 PROCEDURE — 97110 THERAPEUTIC EXERCISES: CPT

## 2025-08-07 PROCEDURE — 97112 NEUROMUSCULAR REEDUCATION: CPT

## 2025-08-12 ENCOUNTER — APPOINTMENT (OUTPATIENT)
Dept: PHYSICAL THERAPY | Age: 75
End: 2025-08-12
Payer: MEDICARE

## 2025-08-15 ENCOUNTER — HOSPITAL ENCOUNTER (OUTPATIENT)
Dept: PHYSICAL THERAPY | Age: 75
Setting detail: THERAPIES SERIES
Discharge: HOME | End: 2025-08-15
Payer: MEDICARE

## 2025-08-15 PROCEDURE — 97530 THERAPEUTIC ACTIVITIES: CPT

## 2025-08-20 ENCOUNTER — HOSPITAL ENCOUNTER (OUTPATIENT)
Dept: PHYSICAL THERAPY | Age: 75
Setting detail: THERAPIES SERIES
Discharge: HOME | End: 2025-08-20
Payer: MEDICARE

## 2025-08-20 PROCEDURE — 97530 THERAPEUTIC ACTIVITIES: CPT

## 2025-08-22 ENCOUNTER — HOSPITAL ENCOUNTER (OUTPATIENT)
Dept: PHYSICAL THERAPY | Age: 75
Setting detail: THERAPIES SERIES
Discharge: HOME | End: 2025-08-22
Payer: MEDICARE

## 2025-08-22 PROCEDURE — 97110 THERAPEUTIC EXERCISES: CPT

## 2025-08-26 ENCOUNTER — HOSPITAL ENCOUNTER (OUTPATIENT)
Dept: PHYSICAL THERAPY | Age: 75
Setting detail: THERAPIES SERIES
Discharge: HOME | End: 2025-08-26
Payer: MEDICARE

## 2025-08-26 PROCEDURE — 97110 THERAPEUTIC EXERCISES: CPT

## 2025-08-29 ENCOUNTER — APPOINTMENT (OUTPATIENT)
Dept: PHYSICAL THERAPY | Age: 75
End: 2025-08-29
Payer: MEDICARE

## 2025-09-02 ENCOUNTER — HOSPITAL ENCOUNTER (OUTPATIENT)
Dept: PHYSICAL THERAPY | Age: 75
Setting detail: THERAPIES SERIES
Discharge: HOME | End: 2025-09-02
Payer: MEDICARE

## 2025-09-02 PROCEDURE — 97110 THERAPEUTIC EXERCISES: CPT

## 2025-09-04 ENCOUNTER — HOSPITAL ENCOUNTER (OUTPATIENT)
Dept: PHYSICAL THERAPY | Age: 75
Setting detail: THERAPIES SERIES
Discharge: HOME | End: 2025-09-04
Payer: MEDICARE

## 2025-09-04 PROCEDURE — 97110 THERAPEUTIC EXERCISES: CPT
